# Patient Record
Sex: FEMALE | Race: WHITE
[De-identification: names, ages, dates, MRNs, and addresses within clinical notes are randomized per-mention and may not be internally consistent; named-entity substitution may affect disease eponyms.]

---

## 2019-03-26 ENCOUNTER — HOSPITAL ENCOUNTER (EMERGENCY)
Dept: HOSPITAL 74 - JER | Age: 34
Discharge: HOME | End: 2019-03-26
Payer: COMMERCIAL

## 2019-03-26 VITALS — SYSTOLIC BLOOD PRESSURE: 149 MMHG | DIASTOLIC BLOOD PRESSURE: 85 MMHG | TEMPERATURE: 98.7 F | HEART RATE: 106 BPM

## 2019-03-26 VITALS — BODY MASS INDEX: 31.2 KG/M2

## 2019-03-26 DIAGNOSIS — K59.09: ICD-10-CM

## 2019-03-26 NOTE — PDOC
History of Present Illness





<JaironMonserratjose manuel Hilario - Last Filed: 19 22:16>





- General


History Source: Patient


Exam Limitations: No Limitations





- History of Present Illness


Initial Comments: 





19 22:22


The patient is a 33 year old  female with prior  who presents to 

the emergency department for evaluation of constipation. Patient reports 

constipation but endorses flatus since Friday when she had a  done on 

Friday at [facility]. She states she has been unable to have a bowel 

movement. 





The patient denies chest pain, shortness of breath, headache, dizziness, nausea

, vomiting, fevers, chills, diarrhea, dysuria, hematuria, and urinary urgency/

frequency.





Allergies: Latex


Social History: No reported alcohol, cigarette, or drug use. 


Surgical History: 


PCP: Not on staff








<Michelle Roberts - Last Filed: 19 22:23>





- General


Chief Complaint: Vaginal Bleeding


Stated Complaint: VAGINAL BLEEDING


Time Seen by Provider: 19 20:17





Past History





- Suicide/Smoking/Psychosocial Hx


Smoking History: Never smoked


Have you smoked in the past 12 months: No


Information on smoking cessation initiated: No


Hx Alcohol Use: No


Drug/Substance Use Hx: No





<Monserrat De La O - Last Filed: 19 22:16>





<Michelle Roberts - Last Filed: 19 22:23>





- Past Medical History


Allergies/Adverse Reactions: 


 Allergies











Allergy/AdvReac Type Severity Reaction Status Date / Time


 


latex Allergy   Verified 19 20:40











Home Medications: 


Ambulatory Orders





Amox-Tr/K Cl [Augmentin - 875Mg Tablet] 1 tab PO BID #14 tablet 17 


Ondansetron [Zofran Odt -] 4 mg SL BID #10 od.tablet 17 











**Review of Systems





- Review of Systems


Able to Perform ROS?: Yes


Comments:: 





19 22:23


CONSTITUTIONAL: 


Absent: fever, chills, diaphoresis, generalized weakness, malaise, loss of 

appetite


HEENT: 


Absent: rhinorrhea, nasal congestion, throat pain, throat swelling, difficulty 

swallowing, mouth swelling, ear pain, eye pain, visual Changes


CARDIOVASCULAR: 


Absent: chest pain, syncope, palpitations, irregular heart rate, lightheadedness

, peripheral edema


RESPIRATORY: 


Absent: cough, shortness of breath, dyspnea with exertion, orthopnea, wheezing, 

stridor, hemoptysis


GASTROINTESTINAL: (+)Constipation. 


Absent: abdominal pain, abdominal distension, nausea, vomiting, diarrhea, melena

, hematochezia


GENITOURINARY: 


Absent: dysuria, frequency, urgency, hesitancy, hematuria, flank pain, genital 

pain


MUSCULOSKELETAL: 


Absent: myalgia, arthralgia, joint swelling


SKIN: 


Absent: rash, itching, pallor


HEMATOLOGIC/IMMUNOLOGIC: 


Absent: easy bleeding, easy bruising, lymphadenopathy, frequent infections


ENDOCRINE:


Absent: unexplained weight gain, unexplained weight loss, heat intolerance, 

cold intolerance


NEUROLOGIC: 


Absent: headache, focal weakness or paresthesias, dizziness, unsteady gait, 

seizure, mental status changes, bladder or bowel incontinence


PSYCHIATRIC: 


Absent: anxiety, depression, suicidal or homicidal ideation, hallucinations.


 





<Michelle Roberts - Last Filed: 19 22:23>





*Physical Exam





- Vital Signs


 Last Vital Signs











Temp Pulse Resp BP Pulse Ox


 


 98.7 F   106 H  18   149/85   98 


 


 19 20:12  19 20:12  19 20:12  19 20:12  19 20:12














<Monserrat De La O - Last Filed: 19 22:16>





- Vital Signs


 Last Vital Signs











Temp Pulse Resp BP Pulse Ox


 


 98.7 F   106 H  18   149/85   98 


 


 19 20:12  19 20:12  19 20:12  19 20:12  19 20:12














- Physical Exam


Comments: 





19 22:23


GENERAL:


Well developed, well nourished. Awake and alert. No acute distress.


HEENT:


Normocephalic, atraumatic. PERRLA, EOMI. No conjunctival pallor. Sclera are non-

icteric. Moist mucous membranes. Oropharynx is clear.


NECK: 


Supple. Full ROM. No JVD. Carotid pulses 2+ and symmetric, without bruits. No 

thyromegaly. No lymphadenopathy.


CARDIOVASCULAR:


Regular rate and rhythm. No murmurs, rubs, or gallops. Distal pulses are 2+ and 

symmetric. 


PULMONARY: 


No evidence of respiratory distress. Lungs clear to auscultation bilaterally. 

No wheezing, rales or rhonchi.


ABDOMINAL:


non tender, surgical scar from . Non-distended. No rebound or guarding.


RECTAL: (+)fecal impaction, no hemorrhoids


MUSCULOSKELETAL 


Normal range of motion at all joints. No bony deformities or tenderness. No CVA 

tenderness.


EXTREMITIES: 


No cyanosis. No clubbing. No edema. No calf tenderness.


SKIN: 


Warm and dry. Normal capillary refill. No rashes. No jaundice. 


NEUROLOGICAL: 


Alert, awake, appropriate. Cranial nerves 2-12 intact. No deficits to light 

touch and temperature in face, upper extremities and lower extremities. No 

motor deficits in the in face, upper extremities and lower extremities. 

Normoreflexic in the upper and lower extremities. Normal speech. Toes are down-

going bilaterally. Gait is normal without ataxia.


PSYCHIATRIC: 


Cooperative. Good eye contact. Appropriate mood and affect.








<Michelle Roberts - Last Filed: 19 22:23>





*DC/Admit/Observation/Transfer





<Monserrat De La O - Last Filed: 19 22:16>





- Attestations


Scribe Attestion: 





19 22:23


Documentation prepared by Michelle Roberts, acting as medical scribe for Monserrat De La O MD.





<Michelle Roberts - Last Filed: 19 22:23>


Diagnosis at time of Disposition: 


 Fecal impaction








- Discharge Dispostion


Disposition: HOME


Condition at time of disposition: Stable





- Referrals


Referrals: 


ON STAFF,NOT [Primary Care Provider] - 





- Patient Instructions


Printed Discharge Instructions:  DI for Constipation, DI for Fecal Impaction


Additional Instructions: 


please take MIRALAX as directed for your constipation


Keep your ob/gyn appointments








- Post Discharge Activity